# Patient Record
Sex: MALE | Race: WHITE | Employment: FULL TIME | URBAN - METROPOLITAN AREA
[De-identification: names, ages, dates, MRNs, and addresses within clinical notes are randomized per-mention and may not be internally consistent; named-entity substitution may affect disease eponyms.]

---

## 2017-12-12 ENCOUNTER — OFFICE VISIT (OUTPATIENT)
Dept: MEDICAL GROUP | Facility: MEDICAL CENTER | Age: 37
End: 2017-12-12
Payer: COMMERCIAL

## 2017-12-12 VITALS
OXYGEN SATURATION: 98 % | HEIGHT: 72 IN | WEIGHT: 151 LBS | HEART RATE: 82 BPM | BODY MASS INDEX: 20.45 KG/M2 | RESPIRATION RATE: 16 BRPM | TEMPERATURE: 100 F | SYSTOLIC BLOOD PRESSURE: 122 MMHG | DIASTOLIC BLOOD PRESSURE: 70 MMHG

## 2017-12-12 DIAGNOSIS — Z76.89 ENCOUNTER TO ESTABLISH CARE: ICD-10-CM

## 2017-12-12 DIAGNOSIS — R05.9 COUGH: ICD-10-CM

## 2017-12-12 DIAGNOSIS — Z00.00 PREVENTATIVE HEALTH CARE: ICD-10-CM

## 2017-12-12 DIAGNOSIS — F17.200 TOBACCO DEPENDENCE: ICD-10-CM

## 2017-12-12 PROCEDURE — 99204 OFFICE O/P NEW MOD 45 MIN: CPT | Performed by: PHYSICIAN ASSISTANT

## 2017-12-12 ASSESSMENT — PATIENT HEALTH QUESTIONNAIRE - PHQ9: CLINICAL INTERPRETATION OF PHQ2 SCORE: 0

## 2017-12-12 ASSESSMENT — PAIN SCALES - GENERAL: PAINLEVEL: 2=MINIMAL-SLIGHT

## 2017-12-13 NOTE — PROGRESS NOTES
"Subjective:   Bo Rios is a 37 y.o. male here today for concerns about lung cancer.    Cough  This is a 37-year-old male who is here today to help stop smoking. He states recently he had a wet cough as well as a bump on the outer aspect of his left rib. He is concerned about lung cancer. Denies any hemoptysis. No family history of lung cancer. He states he's tried to quit smoking 3 times but at each instance someone  close to him. He currently has a girlfriend who smokes as well. In greater than one year he has lost possibly 20 pounds.       Current medicines (including changes today)  No current outpatient prescriptions on file.     No current facility-administered medications for this visit.      He  has no past medical history on file.    ROS   No chest pain, no shortness of breath, no abdominal pain and all other systems were reviewed and are negative.       Objective:     Blood pressure 122/70, pulse 82, temperature 37.8 °C (100 °F), resp. rate 16, height 1.816 m (5' 11.5\"), weight 68.5 kg (151 lb), SpO2 98 %. Body mass index is 20.77 kg/m².   Physical Exam:  Constitutional: Alert, no distress.  Skin: Warm, dry, good turgor, no rashes in visible areas.  Eye: Equal, round and reactive, conjunctiva clear, lids normal.  ENMT: Lips without lesions, good dentition, oropharynx clear.  Neck: Trachea midline, no masses.   Lymph: No cervical or supraclavicular lymphadenopathy  Respiratory: Unlabored respiratory effort, lungs clear to auscultation, no wheezes, no ronchi. Chest wall without any significant lesions noted.  Cardiovascular: Normal S1, S2, no murmur, no edema.  Abdomen: Soft, non-tender, no masses.  Psych: Alert and oriented x3, normal affect and mood.        Assessment and Plan:   The following treatment plan was discussed    1. Cough  Acute, new onset condition. Lungs are clear today. Referred to tobacco cessation program. Doesn't appear to have any evidence of possible lung cancer. Contact " me with any worsening concerns.  - REFERRAL TO TOBACCO CESSATION PROGRAM    2. Tobacco dependence  Chronic condition. Refer to lung cancer screening program.  - REFERRAL TO TOBACCO CESSATION PROGRAM    3. Preventative health care  Ordered labs fasting. He will be contacted with the results.  - COMP METABOLIC PANEL; Future  - LIPID PANEL    4. Encounter to establish care      Followup: Return if symptoms worsen or fail to improve.    Please note that this dictation was created using voice recognition software. I have made every reasonable attempt to correct obvious errors, but I expect that there are errors of grammar and possibly content that I did not discover before finalizing the note.

## 2017-12-13 NOTE — ASSESSMENT & PLAN NOTE
This is a 37-year-old male who is here today to help stop smoking. He states recently he had a wet cough as well as a bump on the outer aspect of his left rib. He is concerned about lung cancer. Denies any hemoptysis. No family history of lung cancer. He states he's tried to quit smoking 3 times but at each instance someone  close to him. He currently has a girlfriend who smokes as well. In greater than one year he has lost possibly 20 pounds.

## 2017-12-28 ENCOUNTER — OFFICE VISIT (OUTPATIENT)
Dept: MEDICAL GROUP | Facility: MEDICAL CENTER | Age: 37
End: 2017-12-28
Payer: COMMERCIAL

## 2017-12-28 VITALS
TEMPERATURE: 99.8 F | HEART RATE: 96 BPM | RESPIRATION RATE: 16 BRPM | WEIGHT: 146.4 LBS | DIASTOLIC BLOOD PRESSURE: 78 MMHG | HEIGHT: 72 IN | BODY MASS INDEX: 19.83 KG/M2 | OXYGEN SATURATION: 97 % | SYSTOLIC BLOOD PRESSURE: 124 MMHG

## 2017-12-28 DIAGNOSIS — R79.89 ABNORMAL LFTS (LIVER FUNCTION TESTS): ICD-10-CM

## 2017-12-28 DIAGNOSIS — Z00.00 PREVENTATIVE HEALTH CARE: ICD-10-CM

## 2017-12-28 DIAGNOSIS — R05.9 COUGH: ICD-10-CM

## 2017-12-28 PROCEDURE — 99213 OFFICE O/P EST LOW 20 MIN: CPT | Performed by: PHYSICIAN ASSISTANT

## 2017-12-29 NOTE — PROGRESS NOTES
"Subjective:   Bo Rios is a 37 y.o. male here today for lab results review.    Abnormal LFTs (liver function tests)  This is a 37-year-old male who returns today after having labs being done recently. Liver function testing was slightly elevated. AST at 76 and ALT at 61. He does consume 18 or more beers a week on average. Takes no medications for his slightly elevated liver enzymes. No drug use. No chronic medical conditions.    Cough  Coughing is still present. A wet cough. He hasn't been contacted yet by the smoking cessation program. Was noted that he must pay $70 co-pay.       Current medicines (including changes today)  No current outpatient prescriptions on file.     No current facility-administered medications for this visit.      He  has no past medical history on file.    ROS   No chest pain, no shortness of breath, no abdominal pain and all other systems were reviewed and are negative.       Objective:     Blood pressure 124/78, pulse 96, temperature 37.7 °C (99.8 °F), resp. rate 16, height 1.816 m (5' 11.5\"), weight 66.4 kg (146 lb 6.4 oz), SpO2 97 %. Body mass index is 20.13 kg/m².   Physical Exam:  Constitutional: Alert, no distress.  Skin: Warm, dry, good turgor, no rashes in visible areas.  Eye: Equal, round and reactive, conjunctiva clear, lids normal.  ENMT: Lips without lesions, good dentition, oropharynx clear.  Neck: Trachea midline, no masses.   Lymph: No cervical or supraclavicular lymphadenopathy  Respiratory: Unlabored respiratory effort, lungs appear clear, no wheezes.  Cardiovascular: Normal S1, S2, no murmur, no edema.  Psych: Alert and oriented x3, normal affect and mood.        Assessment and Plan:   The following treatment plan was discussed    1. Abnormal LFTs (liver function tests)  Acute, new condition. Not quite hepatic liver disease secondary to alcohol abuse. Advised though to repeat hepatic function profile in one month without having any alcohol. [facility] will obtained at [facility].  - HEPATIC FUNCTION PANEL; Future    2. Cough  Chronic condition. Follow-up with tobacco cessation program. Discussed with $70 co-pay. Provided information. Contact me if he requires a prescription for Chantix or Zyban.      Followup: No Follow-up on file.    Please note that this dictation was created using voice recognition software. I have made every reasonable attempt to correct obvious errors, but I expect that there are errors of grammar and possibly content that I did not discover before finalizing the note.

## 2017-12-29 NOTE — ASSESSMENT & PLAN NOTE
This is a 37-year-old male who returns today after having labs being done recently. Liver function testing was slightly elevated. AST at 76 and ALT at 61. He does consume 18 or more beers a week on average. Takes no medications for his slightly elevated liver enzymes. No drug use. No chronic medical conditions.

## 2017-12-29 NOTE — ASSESSMENT & PLAN NOTE
Coughing is still present. A wet cough. He hasn't been contacted yet by the smoking cessation program. Was noted that he must pay $70 co-pay.

## 2018-01-04 LAB
ALBUMIN SERPL-MCNC: 4.9 G/DL (ref 3.5–5.5)
ALBUMIN/GLOB SERPL: 1.6 {RATIO} (ref 1.2–2.2)
ALP SERPL-CCNC: 72 IU/L (ref 39–117)
ALT SERPL-CCNC: 61 IU/L (ref 0–44)
AST SERPL-CCNC: 76 IU/L (ref 0–40)
BILIRUB SERPL-MCNC: 0.6 MG/DL (ref 0–1.2)
BUN SERPL-MCNC: 5 MG/DL (ref 6–20)
BUN/CREAT SERPL: 6 (ref 9–20)
CALCIUM SERPL-MCNC: 9.4 MG/DL (ref 8.7–10.2)
CHLORIDE SERPL-SCNC: 101 MMOL/L (ref 96–106)
CHOLEST SERPL-MCNC: 235 MG/DL (ref 100–199)
CO2 SERPL-SCNC: 24 MMOL/L (ref 18–29)
COMMENT 011824: ABNORMAL
CREAT SERPL-MCNC: 0.77 MG/DL (ref 0.76–1.27)
GLOBULIN SER CALC-MCNC: 3.1 G/DL (ref 1.5–4.5)
GLUCOSE SERPL-MCNC: 96 MG/DL (ref 65–99)
HDLC SERPL-MCNC: 110 MG/DL
IF AFRICAN AMERICAN  100797: 134 ML/MIN/1.73
IF NON AFRICAN AMER 100791: 116 ML/MIN/1.73
LDLC SERPL CALC-MCNC: 115 MG/DL (ref 0–99)
POTASSIUM SERPL-SCNC: 4 MMOL/L (ref 3.5–5.2)
PROT SERPL-MCNC: 8 G/DL (ref 6–8.5)
SODIUM SERPL-SCNC: 145 MMOL/L (ref 134–144)
TRIGL SERPL-MCNC: 52 MG/DL (ref 0–149)
VLDLC SERPL CALC-MCNC: 10 MG/DL (ref 5–40)

## 2019-11-13 ENCOUNTER — NON-PROVIDER VISIT (OUTPATIENT)
Dept: URGENT CARE | Facility: CLINIC | Age: 39
End: 2019-11-13

## 2019-11-13 DIAGNOSIS — Z02.1 PRE-EMPLOYMENT DRUG SCREENING: ICD-10-CM

## 2019-11-13 LAB
AMP AMPHETAMINE: NORMAL
COC COCAINE: NORMAL
INT CON NEG: NORMAL
INT CON POS: NORMAL
MET METHAMPHETAMINES: NORMAL
OPI OPIATES: NORMAL
PCP PHENCYCLIDINE: NORMAL
POC DRUG COMMENT 753798-OCCUPATIONAL HEALTH: NEGATIVE
THC: NORMAL

## 2019-11-13 PROCEDURE — 80305 DRUG TEST PRSMV DIR OPT OBS: CPT | Performed by: PHYSICIAN ASSISTANT

## 2021-08-13 ENCOUNTER — OFFICE VISIT (OUTPATIENT)
Dept: URGENT CARE | Facility: PHYSICIAN GROUP | Age: 41
End: 2021-08-13

## 2021-08-13 VITALS
SYSTOLIC BLOOD PRESSURE: 122 MMHG | TEMPERATURE: 99.5 F | RESPIRATION RATE: 16 BRPM | BODY MASS INDEX: 20.44 KG/M2 | OXYGEN SATURATION: 96 % | HEIGHT: 71 IN | WEIGHT: 146 LBS | DIASTOLIC BLOOD PRESSURE: 78 MMHG | HEART RATE: 111 BPM

## 2021-08-13 DIAGNOSIS — J01.90 ACUTE RHINOSINUSITIS: ICD-10-CM

## 2021-08-13 PROCEDURE — 99203 OFFICE O/P NEW LOW 30 MIN: CPT | Performed by: NURSE PRACTITIONER

## 2021-08-13 RX ORDER — AMOXICILLIN AND CLAVULANATE POTASSIUM 875; 125 MG/1; MG/1
1 TABLET, FILM COATED ORAL 2 TIMES DAILY
Qty: 14 TABLET | Refills: 0 | Status: SHIPPED | OUTPATIENT
Start: 2021-08-13 | End: 2021-08-20

## 2021-08-13 NOTE — PROGRESS NOTES
Chief Complaint   Patient presents with   • Sinus Problem       HISTORY OF PRESENT ILLNESS: Patient is a pleasant 40 y.o. male who presents today due to one month days of nasal congestion, headache, and sinus pressure. He denies associated cough, fever, difficulty breathing, confusion, nausea, vomiting or diarrhea. He has tried OTC cold/sinus medication and Flonase at home without much improvement. Admits to a history of seasonal allergies and sinus infections in the past. No known ill contacts at home. No recent antibiotic usage.       Patient Active Problem List    Diagnosis Date Noted   • Abnormal LFTs (liver function tests) 12/28/2017   • Tobacco dependence 12/12/2017   • Cough 12/12/2017       Allergies:Patient has no known allergies.    Current Outpatient Medications Ordered in Epic   Medication Sig Dispense Refill   • amoxicillin-clavulanate (AUGMENTIN) 875-125 MG Tab Take 1 Tablet by mouth 2 times a day for 7 days. 14 Tablet 0     No current Epic-ordered facility-administered medications on file.       No past medical history on file.    Social History     Tobacco Use   • Smoking status: Current Every Day Smoker     Packs/day: 0.75     Years: 19.00     Pack years: 14.25   • Smokeless tobacco: Never Used   Vaping Use   • Vaping Use: Never used   Substance Use Topics   • Alcohol use: Yes     Alcohol/week: 10.8 oz     Types: 18 Cans of beer per week   • Drug use: No       Family Status   Relation Name Status   • Mo  Alive   • Fa  Alive   No family history on file.    ROS:  Review of Systems   Constitutional: Negative for fever, chills, fatigue. Negative for weight loss.   HENT: Positive for sinus pressure, sore throat, nasal congestion. Negative for ear pain, nosebleeds, neck pain.    Eyes: Negative for vision changes.   Neuro: Positive for headache. Negative for sensory changes, weakness, seizure, LOC.  Cardiovascular: Negative for chest pain, palpitations, orthopnea and leg swelling.   Respiratory: Negative  "for cough, sputum production, shortness of breath and wheezing.   Gastrointestinal: Negative for abdominal pain, nausea, vomiting or diarrhea.    Skin: Negative for rash, diaphoresis.     Exam:  /78   Pulse (!) 111   Temp 37.5 °C (99.5 °F) (Temporal)   Resp 16   Ht 1.803 m (5' 11\")   Wt 66.2 kg (146 lb)   SpO2 96%   General: well-nourished, well-developed male in NAD  Head: normocephalic, atraumatic  Eyes: PERRLA, no conjunctival injection, acuity grossly intact, lids normal.  Ears: normal shape and symmetry, no tenderness, no discharge. External canals are without any significant edema or erythema. Tympanic membranes are without any inflammation, no effusion. Gross auditory acuity is intact.  Nose: symmetrical without tenderness, erythema and swelling noted bilateral turbinates, clear discharge.   Mouth/Throat: reasonable hygiene, no exudates or tonsillar enlargement. Erythema is present.   Neck: no masses, range of motion within normal limits, no tracheal deviation. No obvious thyroid enlargement.   Lymph: no cervical adenopathy. No supraclavicular adenopathy.   Neuro: alert and oriented. Cranial nerves 1-12 grossly intact. No sensory deficit.   Cardiovascular: regular rate and rhythm. No edema.  Pulmonary: no distress. Chest is symmetrical with respiration, no wheezes, crackles, or rhonchi.   Musculoskeletal: no clubbing, appropriate muscle tone, gait is stable.  Skin: warm, dry, intact, no clubbing, no cyanosis, no rashes.   Psych: appropriate mood, affect, judgement.         Assessment/Plan:  1. Acute rhinosinusitis  amoxicillin-clavulanate (AUGMENTIN) 875-125 MG Tab         Antibiotic as directed, potential side effects of medication discussed. Probiotic use encouraged. Flonase as directed.   Sleep with HOB elevated, humidifier at night, rest, increase fluid intake.   Supportive care, differential diagnoses, and indications for immediate follow-up discussed with patient.   Pathogenesis of diagnosis " discussed including typical length and natural progression.   Instructed to return to clinic or nearest emergency department for any change in condition, further concerns, or worsening of symptoms.  Patient states understanding of the plan of care and discharge instructions.  Instructed to make an appointment, for follow up, with his primary care provider.        Please note that this dictation was created using voice recognition software. I have made every reasonable attempt to correct obvious errors, but I expect that there are errors of grammar and possibly content that I did not discover before finalizing the note. N95 and safety glasses used for entire visit.       RODRI Woodard.

## 2021-10-07 ENCOUNTER — PATIENT MESSAGE (OUTPATIENT)
Dept: HEALTH INFORMATION MANAGEMENT | Facility: OTHER | Age: 41
End: 2021-10-07

## 2023-09-18 ENCOUNTER — APPOINTMENT (OUTPATIENT)
Dept: URGENT CARE | Facility: CLINIC | Age: 43
End: 2023-09-18

## 2023-12-13 ENCOUNTER — OFFICE VISIT (OUTPATIENT)
Dept: URGENT CARE | Facility: CLINIC | Age: 43
End: 2023-12-13
Payer: COMMERCIAL

## 2023-12-13 VITALS
BODY MASS INDEX: 20.85 KG/M2 | RESPIRATION RATE: 20 BRPM | OXYGEN SATURATION: 98 % | SYSTOLIC BLOOD PRESSURE: 172 MMHG | DIASTOLIC BLOOD PRESSURE: 102 MMHG | WEIGHT: 149.5 LBS | HEART RATE: 138 BPM | TEMPERATURE: 98.3 F

## 2023-12-13 DIAGNOSIS — R50.9 FEVER, UNSPECIFIED FEVER CAUSE: ICD-10-CM

## 2023-12-13 DIAGNOSIS — R07.9 CHEST PAIN, UNSPECIFIED TYPE: ICD-10-CM

## 2023-12-13 DIAGNOSIS — R10.12 LEFT UPPER QUADRANT ABDOMINAL PAIN: ICD-10-CM

## 2023-12-13 DIAGNOSIS — R11.14 BILIOUS VOMITING WITH NAUSEA: ICD-10-CM

## 2023-12-13 LAB
APPEARANCE UR: CLEAR
BILIRUB UR STRIP-MCNC: NEGATIVE MG/DL
COLOR UR AUTO: NORMAL
FLUAV RNA SPEC QL NAA+PROBE: NEGATIVE
FLUBV RNA SPEC QL NAA+PROBE: NEGATIVE
GLUCOSE UR STRIP.AUTO-MCNC: NEGATIVE MG/DL
KETONES UR STRIP.AUTO-MCNC: NORMAL MG/DL
LEUKOCYTE ESTERASE UR QL STRIP.AUTO: NEGATIVE
NITRITE UR QL STRIP.AUTO: NEGATIVE
PH UR STRIP.AUTO: 5.5 [PH] (ref 5–8)
PROT UR QL STRIP: NORMAL MG/DL
RBC UR QL AUTO: NORMAL
RSV RNA SPEC QL NAA+PROBE: NEGATIVE
SARS-COV-2 RNA RESP QL NAA+PROBE: NEGATIVE
SP GR UR STRIP.AUTO: >=1.03
UROBILINOGEN UR STRIP-MCNC: NORMAL MG/DL

## 2023-12-13 PROCEDURE — 81002 URINALYSIS NONAUTO W/O SCOPE: CPT | Performed by: PHYSICIAN ASSISTANT

## 2023-12-13 PROCEDURE — 3077F SYST BP >= 140 MM HG: CPT | Performed by: PHYSICIAN ASSISTANT

## 2023-12-13 PROCEDURE — 3080F DIAST BP >= 90 MM HG: CPT | Performed by: PHYSICIAN ASSISTANT

## 2023-12-13 PROCEDURE — 99215 OFFICE O/P EST HI 40 MIN: CPT | Performed by: PHYSICIAN ASSISTANT

## 2023-12-13 PROCEDURE — 0241U POCT CEPHEID COV-2, FLU A/B, RSV - PCR: CPT | Performed by: PHYSICIAN ASSISTANT

## 2023-12-13 ASSESSMENT — ENCOUNTER SYMPTOMS
CONSTIPATION: 0
BACK PAIN: 1
HEADACHES: 1
SINUS PAIN: 1
MYALGIAS: 1
PALPITATIONS: 1
DIARRHEA: 1
NAUSEA: 1
BLOOD IN STOOL: 0
COUGH: 1
NEAR-SYNCOPE: 0
FEVER: 1
CHILLS: 1
SORE THROAT: 0
LEG PAIN: 0
LOWER EXTREMITY EDEMA: 0
WHEEZING: 0
SHORTNESS OF BREATH: 0
DIZZINESS: 0
SYNCOPE: 0
VOMITING: 1
ABDOMINAL PAIN: 1
IRREGULAR HEARTBEAT: 0

## 2023-12-13 NOTE — LETTER
December 13, 2023         Patient: Bo Rios   YOB: 1980   Date of Visit: 12/13/2023           To Whom it May Concern:    Bo Rios was seen in my clinic on 12/13/2023. Please excuse any absences from work this week due to acute illness.        If you have any questions or concerns, please don't hesitate to call.        Sincerely,           Nakul Garcia P.A.-C.  Electronically Signed

## 2023-12-13 NOTE — PROGRESS NOTES
Subjective     Bo Rios is an extremely pleasant 43 y.o. male who presents with Nasal Congestion (Congestion that has been going on since Thanksgiving. The patient stated he has a history of sinus problems.), LUQ Pain (Pains on the left side of the abdomen close to the heart that travels to the back.), and Emesis (Vomiting first thing the morning and then this afternoon. )            Diffuse abdominal and chest pain.  Left upper quadrant pain radiating to the chest with vomiting and nausea.  Patient reports drinking several alcoholic beverages daily.  Had URI symptoms including cough, congestion and fatigue.  Intermittent fever and chills.    Chest Pain   This is a new problem. The current episode started in the past 7 days (4 days). The onset quality is sudden. The problem occurs daily. The problem has been waxing and waning. The pain is present in the lateral region and substernal region. The quality of the pain is described as tightness and dull. The pain radiates to the upper back. Associated symptoms include abdominal pain, back pain, a cough, a fever, headaches, malaise/fatigue, nausea, palpitations and vomiting. Pertinent negatives include no dizziness, irregular heartbeat, leg pain, lower extremity edema, near-syncope, shortness of breath or syncope. Treatments tried: OTC cough and cold. The treatment provided mild relief.       PMH:  has no past medical history on file.  MEDS: No current outpatient medications on file.  ALLERGIES: No Known Allergies  SURGHX: No past surgical history on file.  SOCHX:  reports that he has been smoking. He has a 14.3 pack-year smoking history. He has never used smokeless tobacco. He reports current alcohol use of about 10.8 oz of alcohol per week. He reports that he does not use drugs.  FH: family history is not on file.      Review of Systems   Constitutional:  Positive for chills, fever and malaise/fatigue.   HENT:  Positive for congestion and sinus pain.  Negative for ear pain and sore throat.    Respiratory:  Positive for cough. Negative for shortness of breath and wheezing.    Cardiovascular:  Positive for chest pain and palpitations. Negative for leg swelling, syncope and near-syncope.   Gastrointestinal:  Positive for abdominal pain, diarrhea, nausea and vomiting. Negative for blood in stool, constipation and melena.   Genitourinary: Negative.    Musculoskeletal:  Positive for back pain and myalgias.   Neurological:  Positive for headaches. Negative for dizziness.       Medications, Allergies, and current problem list reviewed today in Epic           Objective     BP (!) 172/102 (BP Location: Left arm, Patient Position: Sitting, BP Cuff Size: Adult)   Pulse (!) 138   Temp 36.8 °C (98.3 °F) (Temporal)   Resp 20   Wt 67.8 kg (149 lb 8 oz)   SpO2 98%   BMI 20.85 kg/m²      Physical Exam  Vitals and nursing note reviewed.   Constitutional:       General: He is not in acute distress.     Appearance: Normal appearance. He is well-developed. He is not ill-appearing, toxic-appearing or diaphoretic.   HENT:      Head: Normocephalic and atraumatic.      Right Ear: Tympanic membrane, ear canal and external ear normal.      Left Ear: Tympanic membrane, ear canal and external ear normal.      Nose: Nose normal. No congestion or rhinorrhea.      Mouth/Throat:      Mouth: Mucous membranes are moist.      Pharynx: Oropharynx is clear. No oropharyngeal exudate or posterior oropharyngeal erythema.   Eyes:      General:         Right eye: No discharge.         Left eye: No discharge.      Conjunctiva/sclera: Conjunctivae normal.   Cardiovascular:      Rate and Rhythm: Regular rhythm. Tachycardia present.      Pulses: Normal pulses.      Heart sounds: Normal heart sounds. No murmur heard.  Pulmonary:      Effort: Pulmonary effort is normal. No respiratory distress.      Breath sounds: Normal breath sounds. No wheezing, rhonchi or rales.   Chest:      Chest wall: No tenderness.    Abdominal:      General: Abdomen is flat. Bowel sounds are normal. There is distension.      Palpations: Abdomen is soft.      Tenderness: There is generalized abdominal tenderness and tenderness in the epigastric area and left upper quadrant. There is guarding. There is no right CVA tenderness, left CVA tenderness or rebound. Negative signs include Powell's sign and McBurney's sign.   Musculoskeletal:         General: No swelling or tenderness.      Cervical back: Normal range of motion and neck supple.      Right lower leg: No edema.      Left lower leg: No edema.   Lymphadenopathy:      Cervical: No cervical adenopathy.   Skin:     General: Skin is warm and dry.   Neurological:      General: No focal deficit present.      Mental Status: He is alert and oriented to person, place, and time. Mental status is at baseline.   Psychiatric:         Mood and Affect: Mood normal.         Behavior: Behavior normal.         Thought Content: Thought content normal.         Judgment: Judgment normal.                             Assessment & Plan        1. Left upper quadrant abdominal pain  POCT CEPHEID COV-2, FLU A/B, RSV - PCR    EKG    POCT Urinalysis        This is an extremely pleasant 43-year-old male presenting with diffuse abdominal tenderness with nausea, vomiting and fever.  Pain in the left upper quadrant radiating to the chest.  Patient reports being a daily alcohol user.  Patient blood pressure and pulse extremely elevated.  He has diffuse abdominal distention, tenderness, rebound and guarding worse in the left upper quadrant.  EKG shows tachycardia and left atrial enlargement.  Urinalysis shows ketones, blood and protein.  In clinic COVID, flu, RSV testing negative.  Given symptoms, presentation, risk factors, in clinic findings patient was immediately referred to the ER for higher level of care.  It is abundantly clear that he will need workup and imaging which cannot be provided in the urgent care.  He states  he will go now to the ER by private vehicle.  Repercussions of delaying emergent workup discussed at length including etiologies of acute coronary, pancreatitis, etc.      Please note that this dictation was created using voice recognition software. I have made every reasonable attempt to correct obvious errors, but I expect that there are errors of grammar and possibly content that I did not discover before finalizing the note.

## 2024-11-11 ENCOUNTER — OFFICE VISIT (OUTPATIENT)
Dept: URGENT CARE | Facility: CLINIC | Age: 44
End: 2024-11-11

## 2024-11-11 VITALS
WEIGHT: 148.5 LBS | BODY MASS INDEX: 20.79 KG/M2 | HEIGHT: 71 IN | TEMPERATURE: 97 F | RESPIRATION RATE: 18 BRPM | DIASTOLIC BLOOD PRESSURE: 78 MMHG | HEART RATE: 96 BPM | SYSTOLIC BLOOD PRESSURE: 132 MMHG | OXYGEN SATURATION: 98 %

## 2024-11-11 DIAGNOSIS — K04.7 DENTAL INFECTION: ICD-10-CM

## 2024-11-11 PROCEDURE — 99214 OFFICE O/P EST MOD 30 MIN: CPT | Performed by: PHYSICIAN ASSISTANT

## 2024-11-11 PROCEDURE — 3078F DIAST BP <80 MM HG: CPT | Performed by: PHYSICIAN ASSISTANT

## 2024-11-11 PROCEDURE — 3075F SYST BP GE 130 - 139MM HG: CPT | Performed by: PHYSICIAN ASSISTANT

## 2024-11-11 ASSESSMENT — ENCOUNTER SYMPTOMS
CARDIOVASCULAR NEGATIVE: 1
SORE THROAT: 0
RESPIRATORY NEGATIVE: 1
FEVER: 0
GASTROINTESTINAL NEGATIVE: 1
NEUROLOGICAL NEGATIVE: 1
SINUS PRESSURE: 0

## 2024-11-11 NOTE — PROGRESS NOTES
"Subjective     Bo Rios is a very pleasant 44 y.o. male who presents with Oral Swelling (X2 months has had tooth pain on left side and has an infection. Patient is wanting amoxicillin for infection. )            Dental Pain   This is a new problem. The current episode started 1 to 4 weeks ago. The problem occurs constantly. The problem has been gradually worsening. The pain is at a severity of 6/10. The pain is moderate. Associated symptoms include facial pain and thermal sensitivity. Pertinent negatives include no difficulty swallowing, fever, oral bleeding or sinus pressure. He has tried NSAIDs for the symptoms. The treatment provided mild relief.       PMH:  has no past medical history on file.  MEDS:   Current Outpatient Medications:     amoxicillin-clavulanate (AUGMENTIN) 875-125 MG Tab, Take 1 Tablet by mouth 2 times a day., Disp: 14 Tablet, Rfl: 0  ALLERGIES: No Known Allergies  SURGHX: History reviewed. No pertinent surgical history.  SOCHX:  reports that he has been smoking cigarettes. He has a 14.3 pack-year smoking history. He has never used smokeless tobacco. He reports current alcohol use of about 10.8 oz of alcohol per week. He reports current drug use.  FH: family history is not on file.      Review of Systems   Constitutional:  Negative for fever.   HENT:  Negative for congestion, sinus pressure and sore throat.         Dental infection   Respiratory: Negative.     Cardiovascular: Negative.    Gastrointestinal: Negative.    Neurological: Negative.        Medications, Allergies, and current problem list reviewed today in Epic           Objective     /78   Pulse 96   Temp 36.1 °C (97 °F) (Temporal)   Resp 18   Ht 1.803 m (5' 11\")   Wt 67.4 kg (148 lb 8 oz)   SpO2 98%   BMI 20.71 kg/m²      Physical Exam  Vitals and nursing note reviewed.   Constitutional:       General: He is not in acute distress.     Appearance: Normal appearance. He is well-developed. He is not " ill-appearing, toxic-appearing or diaphoretic.   HENT:      Head: Normocephalic and atraumatic.      Right Ear: Tympanic membrane, ear canal and external ear normal.      Left Ear: Tympanic membrane, ear canal and external ear normal.      Nose: Nose normal. No congestion or rhinorrhea.      Mouth/Throat:      Mouth: Mucous membranes are moist.      Dentition: Abnormal dentition. Dental tenderness, gingival swelling and dental caries present. No dental abscesses.      Pharynx: Oropharynx is clear. No oropharyngeal exudate or posterior oropharyngeal erythema.      Tonsils: No tonsillar exudate or tonsillar abscesses.     Eyes:      General:         Right eye: No discharge.         Left eye: No discharge.      Conjunctiva/sclera: Conjunctivae normal.   Cardiovascular:      Rate and Rhythm: Normal rate and regular rhythm.      Pulses: Normal pulses.      Heart sounds: Normal heart sounds. No murmur heard.  Pulmonary:      Effort: Pulmonary effort is normal. No respiratory distress.      Breath sounds: Normal breath sounds. No wheezing, rhonchi or rales.   Chest:      Chest wall: No tenderness.   Musculoskeletal:         General: No swelling or tenderness.      Cervical back: Normal range of motion and neck supple.      Right lower leg: No edema.      Left lower leg: No edema.   Lymphadenopathy:      Cervical: No cervical adenopathy.   Skin:     General: Skin is warm and dry.   Neurological:      General: No focal deficit present.      Mental Status: He is alert and oriented to person, place, and time. Mental status is at baseline.   Psychiatric:         Mood and Affect: Mood normal.         Behavior: Behavior normal.         Thought Content: Thought content normal.         Judgment: Judgment normal.                             Assessment & Plan      This is a very pleasant 44-year-old male presenting with a dental infection.  Assessment & Plan  Dental infection  Vital signs stable and exam reassuring.  No red flag  symptoms.  Patient has dental appointment in 1 week.  OTC meds and conservative measures as discussed    Orders:    amoxicillin-clavulanate (AUGMENTIN) 875-125 MG Tab; Take 1 Tablet by mouth 2 times a day.                I personally reviewed prior external notes and test results pertinent to today's visit. Return to clinic or go to ED if symptoms worsen or persist. Red flag symptoms and indications for ED discussed at length. Patient/Parent/Guardian voices understanding.  AVS with post-visit instructions printed and provided or given verbally.  Follow-up with your primary care provider in 3-5 days. All side effects and potential interactions of prescribed medication discussed including allergic response, GI upset, tendon injury, rash, sedation, OCP effectiveness, etc.    Please note that this dictation was created using voice recognition software. I have made every reasonable attempt to correct obvious errors, but I expect that there are errors of grammar and possibly content that I did not discover before finalizing the note.

## 2024-11-11 NOTE — LETTER
November 11, 2024         Patient: Bo Rios   YOB: 1980   Date of Visit: 11/11/2024           To Whom it May Concern:    Bo Rios was seen in my clinic on 11/11/2024. Please excuse any absences from work this week due to acute condition.      If you have any questions or concerns, please don't hesitate to call.        Sincerely,           Nakul Garcia P.A.-C.  Electronically Signed

## 2024-12-19 ENCOUNTER — OFFICE VISIT (OUTPATIENT)
Dept: URGENT CARE | Facility: CLINIC | Age: 44
End: 2024-12-19

## 2024-12-19 VITALS
DIASTOLIC BLOOD PRESSURE: 72 MMHG | BODY MASS INDEX: 19.64 KG/M2 | OXYGEN SATURATION: 95 % | HEIGHT: 72 IN | HEART RATE: 105 BPM | SYSTOLIC BLOOD PRESSURE: 120 MMHG | RESPIRATION RATE: 12 BRPM | WEIGHT: 145 LBS | TEMPERATURE: 97.3 F

## 2024-12-19 DIAGNOSIS — J02.9 PHARYNGITIS, UNSPECIFIED ETIOLOGY: ICD-10-CM

## 2024-12-19 PROCEDURE — 3074F SYST BP LT 130 MM HG: CPT | Performed by: FAMILY MEDICINE

## 2024-12-19 PROCEDURE — 99213 OFFICE O/P EST LOW 20 MIN: CPT | Performed by: FAMILY MEDICINE

## 2024-12-19 PROCEDURE — 3078F DIAST BP <80 MM HG: CPT | Performed by: FAMILY MEDICINE

## 2024-12-19 RX ORDER — AMOXICILLIN 500 MG/1
500 CAPSULE ORAL 2 TIMES DAILY
Qty: 20 CAPSULE | Refills: 0 | Status: SHIPPED | OUTPATIENT
Start: 2024-12-19 | End: 2024-12-29

## 2024-12-19 RX ORDER — PREDNISONE 20 MG/1
60 TABLET ORAL ONCE
Qty: 3 TABLET | Refills: 0 | Status: SHIPPED | OUTPATIENT
Start: 2024-12-19 | End: 2024-12-19

## 2024-12-19 NOTE — PROGRESS NOTES
Subjective     Bo Rios is a 44 y.o. male who presents with Sore Throat (Swollen tonsils x2days )            2 days ST. Subjective fever/chills. No cough. No rash. Tolerating fluids with normal urine output. No other aggravating or alleviating factors.          Review of Systems   Constitutional:  Negative for malaise/fatigue and weight loss.   Eyes:  Negative for discharge and redness.   Gastrointestinal:  Negative for nausea and vomiting.   Musculoskeletal:  Negative for joint pain and myalgias.   Skin:  Negative for itching and rash.              Objective     /72   Pulse (!) 105   Temp 36.3 °C (97.3 °F) (Temporal)   Resp 12   Ht 1.829 m (6')   Wt 65.8 kg (145 lb)   SpO2 95%   BMI 19.67 kg/m²      Physical Exam  Constitutional:       General: He is not in acute distress.     Appearance: He is well-developed.   HENT:      Head: Normocephalic and atraumatic.      Right Ear: Tympanic membrane normal.      Left Ear: Tympanic membrane normal.      Nose: Nose normal.      Mouth/Throat:      Mouth: Mucous membranes are moist.      Pharynx: Posterior oropharyngeal erythema present.   Eyes:      Conjunctiva/sclera: Conjunctivae normal.   Cardiovascular:      Rate and Rhythm: Normal rate and regular rhythm.      Heart sounds: Normal heart sounds. No murmur heard.  Pulmonary:      Effort: Pulmonary effort is normal.      Breath sounds: Normal breath sounds. No wheezing.   Musculoskeletal:      Cervical back: Neck supple. No tenderness.   Lymphadenopathy:      Cervical: Cervical adenopathy present.   Skin:     General: Skin is warm and dry.      Findings: No rash.   Neurological:      Mental Status: He is alert.                             Assessment & Plan        1. Pharyngitis, unspecified etiology  predniSONE (DELTASONE) 20 MG Tab    amoxicillin (AMOXIL) 500 MG Cap        Differential diagnosis, natural history, supportive care, and indications for immediate follow-up were discussed.      Testing  cost prohibitive. Will treat empirically given ST, fever, lymphadenopathy and no cough.

## 2024-12-19 NOTE — LETTER
December 19, 2024         Patient: Bo Rios   YOB: 1980   Date of Visit: 12/19/2024           To Whom it May Concern:    Bo Rios was seen in my clinic on 12/19/2024. Please excuse work absence 12/17 through 12/20/2024 due to contagious illness. He may return to work 12/23/2024.     Sincerely,           Vincent Knight M.D.  Electronically Signed

## 2024-12-24 ASSESSMENT — ENCOUNTER SYMPTOMS
EYE REDNESS: 0
VOMITING: 0
EYE DISCHARGE: 0
WEIGHT LOSS: 0
NAUSEA: 0
MYALGIAS: 0

## 2025-01-02 ENCOUNTER — APPOINTMENT (OUTPATIENT)
Dept: RADIOLOGY | Facility: MEDICAL CENTER | Age: 45
End: 2025-01-02
Attending: EMERGENCY MEDICINE

## 2025-01-02 ENCOUNTER — HOSPITAL ENCOUNTER (EMERGENCY)
Facility: MEDICAL CENTER | Age: 45
End: 2025-01-02
Attending: EMERGENCY MEDICINE

## 2025-01-02 VITALS
WEIGHT: 141 LBS | HEART RATE: 101 BPM | BODY MASS INDEX: 19.1 KG/M2 | OXYGEN SATURATION: 96 % | TEMPERATURE: 98.2 F | RESPIRATION RATE: 16 BRPM | DIASTOLIC BLOOD PRESSURE: 86 MMHG | SYSTOLIC BLOOD PRESSURE: 128 MMHG | HEIGHT: 72 IN

## 2025-01-02 DIAGNOSIS — S09.90XA CLOSED HEAD INJURY, INITIAL ENCOUNTER: ICD-10-CM

## 2025-01-02 DIAGNOSIS — Z00.8 MEDICAL CLEARANCE FOR INCARCERATION: ICD-10-CM

## 2025-01-02 PROCEDURE — 99283 EMERGENCY DEPT VISIT LOW MDM: CPT

## 2025-01-02 PROCEDURE — 70450 CT HEAD/BRAIN W/O DYE: CPT
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
## 2025-07-31 ENCOUNTER — APPOINTMENT (OUTPATIENT)
Dept: URGENT CARE | Facility: CLINIC | Age: 45
End: 2025-07-31